# Patient Record
Sex: FEMALE | Race: OTHER | Employment: OTHER | URBAN - METROPOLITAN AREA
[De-identification: names, ages, dates, MRNs, and addresses within clinical notes are randomized per-mention and may not be internally consistent; named-entity substitution may affect disease eponyms.]

---

## 2017-02-06 NOTE — PATIENT DISCUSSION
Posterior Vitreous Detachment Counseling: I have discussed the diagnosis of PVD with the patient and the possibility of a retinal tear or detachment. The signs/symptoms of a retinal tear/detachment were reviewed to include but not limited to change in flashes of light, increase or change in floaters, decreased vision, part of the vision missing, veils or any other ocular concerns. I have further explained not all patients who develop a tear or detachment have notable symptoms, therefore, compliance with return visits are necessary. The patient was instructed to contact us immediately if they noticed any of the signs or symptoms of retinal detachment as noted above as the prognosis for any potential treatment options may be time related. Return for follow-up as scheduled.

## 2017-02-06 NOTE — PATIENT DISCUSSION
POSTERIOR VITREOUS DETACHMENT OU_: NO HOLES, TEARS, OR RETINAL DETACHMENTS TODAY. ADVISED PT TO CALL IF ANY FLASHES OF LIGHT, INCREASE IN FLOATERS, OR DECREASE VISION IN EITHER EYE.

## 2020-07-27 NOTE — PATIENT DISCUSSION
CATARACTS, OU: VISUALLY SIGNIFICANT. OPTION OF SURGERY VERSUS FOLLOWING VERSUS UPDATING GLASSES DISCUSSED. RBA'S DISCUSSED, PATIENT UNDERSTANDS AND DESIRES SURGERY TO INCREASE VISION FOR WATCHING TV, ENJOYING OUTDOOR ACTIVITIES, SEEING STREET SIGNS, AND TO REDUCE GLARE IN ORDER TO DRIVE SAFELY AT NIGHT. SCHEDULE CATARACT SURGERY/PRE-OP OU.

## 2020-08-17 NOTE — PATIENT DISCUSSION
CATARACT SX OD 08/25/2020: RBA'S DISCUSSED, PATIENT UNDERSTANDS AND DESIRES TO PROCEED WITH SURGERY. CONSENT READ AND SIGNED.   PATIENT DESIRES TORIC MULTIFOCAL PANOPTIX LENS OD.

## 2020-08-17 NOTE — PATIENT DISCUSSION
New Prescription: prednisoln ac-csjtyxee-rnwthlb (prednisoln yv-vrcjcern-uqoovhm): drops: 1-0.5-0.075% 1 drop four times a day as directed into affected eye 08-

## 2020-08-26 NOTE — PATIENT DISCUSSION
Continue: prednisoln am-qgsodmav-hdrikgb (prednisoln ef-oktlvgxj-xlgvunu): drops: 1-0.5-0.075% 1 drop four times a day as directed into affected eye 08-

## 2020-08-31 ENCOUNTER — NEW PATIENT COMPREHENSIVE (OUTPATIENT)
Dept: URBAN - METROPOLITAN AREA CLINIC 46 | Facility: CLINIC | Age: 60
End: 2020-08-31

## 2020-08-31 DIAGNOSIS — H52.7: ICD-10-CM

## 2020-08-31 PROCEDURE — 92004 COMPRE OPH EXAM NEW PT 1/>: CPT

## 2020-08-31 PROCEDURE — 92015 DETERMINE REFRACTIVE STATE: CPT

## 2020-08-31 ASSESSMENT — VISUAL ACUITY
OS_CC: J1
OS_CC: 20/20-1
OS_SC: J10
OD_SC: J10
OD_CC: 20/20-1
OS_SC: 20/25
OD_SC: 20/50-2
OD_CC: J1

## 2020-08-31 ASSESSMENT — TONOMETRY
OS_IOP_MMHG: 16
OD_IOP_MMHG: 15

## 2020-09-10 NOTE — PATIENT DISCUSSION
CATARACT OS: RBA'S DISCUSSED, PATIENT UNDERSTANDS AND DESIRES TO PROCEED WITH SURGERY. CONSENT READ AND SIGNED.   PATIENT DESIRES PANOPTIX MF TORIC

## 2020-09-10 NOTE — PATIENT DISCUSSION
Pre-Op 2nd Eye Counseling: The patient has noticed an improvement in their visual symptoms in the operative eye. The patient complains of decreased vision in the fellow eye when watching tv and driving. It was explained to the patient that the decision to proceed with cataract surgery in the fellow eye is entirely a separate decision from the surgical eye. All of the same risks, benefits and alternatives ere reviewed with the patient again. The patient does feel the vision in the non-operative eye is limiting their daily activities and elects to proceed with cataract surgery in the left eye. Schedule cataract surgery/ pre op OS.

## 2020-09-10 NOTE — PATIENT DISCUSSION
S/P PC IOL, OD.  DOING WELL. CONTINUE DROPS AS DIRECTED. SPECS RX OFFERED. RX ARC IN SPECS TO MINIMIZE GLARE. RETURN FOR FOLLOW-UP AS SCHEDULED.

## 2020-09-10 NOTE — PATIENT DISCUSSION
Continue: prednisoln vs-kickqrro-gfadahy (prednisoln ok-bjfoitle-revyief): drops: 1-0.5-0.075% 1 drop four times a day as directed into affected eye 08-

## 2020-09-23 NOTE — PATIENT DISCUSSION
Continue: prednisoln xr-ffzquqam-fnetshg (prednisoln rk-xcrivuhq-zcecdrn): drops: 1-0.5-0.075% 1 drop four times a day as directed into affected eye 08-

## 2020-10-06 NOTE — PATIENT DISCUSSION
Continue: prednisoln fr-ephsccrx-ayaabul (prednisoln pe-qugewmaq-mqilljm): drops: 1-0.5-0.075% 1 drop four times a day as directed into affected eye 08-

## 2021-09-15 ENCOUNTER — ESTABLISHED COMPREHENSIVE EXAM (OUTPATIENT)
Dept: URBAN - METROPOLITAN AREA CLINIC 46 | Facility: CLINIC | Age: 61
End: 2021-09-15

## 2021-09-15 DIAGNOSIS — H52.7: ICD-10-CM

## 2021-09-15 PROCEDURE — 92014 COMPRE OPH EXAM EST PT 1/>: CPT

## 2021-09-15 PROCEDURE — 92015 DETERMINE REFRACTIVE STATE: CPT

## 2021-09-15 ASSESSMENT — VISUAL ACUITY
OS_CC: J2
OD_SC: J10
OS_CC: 20/20
OD_SC: 20/70
OS_SC: 20/25
OD_CC: J2
OS_SC: J10
OD_CC: 20/20

## 2021-09-15 ASSESSMENT — TONOMETRY
OS_IOP_MMHG: 14
OD_IOP_MMHG: 14

## 2022-09-20 ENCOUNTER — COMPREHENSIVE EXAM (OUTPATIENT)
Dept: URBAN - METROPOLITAN AREA CLINIC 46 | Facility: CLINIC | Age: 62
End: 2022-09-20

## 2022-09-20 DIAGNOSIS — H52.7: ICD-10-CM

## 2022-09-20 PROCEDURE — 92014 COMPRE OPH EXAM EST PT 1/>: CPT

## 2022-09-20 PROCEDURE — 92015 DETERMINE REFRACTIVE STATE: CPT

## 2022-09-20 ASSESSMENT — VISUAL ACUITY
OD_CC: J1
OU_CC: J1+
OS_SC: 20/25-1
OS_CC: J1+
OS_CC: 20/20
OU_SC: 20/25
OS_SC: J8
OU_CC: 20/15-1
OD_SC: J8
OD_SC: 20/40-1
OU_SC: J4
OD_CC: 20/20

## 2022-09-20 ASSESSMENT — TONOMETRY
OD_IOP_MMHG: 12
OS_IOP_MMHG: 12

## 2023-09-21 ENCOUNTER — COMPREHENSIVE EXAM (OUTPATIENT)
Dept: URBAN - METROPOLITAN AREA CLINIC 46 | Facility: CLINIC | Age: 63
End: 2023-09-21

## 2023-09-21 DIAGNOSIS — H52.7: ICD-10-CM

## 2023-09-21 PROCEDURE — 92015 DETERMINE REFRACTIVE STATE: CPT

## 2023-09-21 PROCEDURE — 92014 COMPRE OPH EXAM EST PT 1/>: CPT

## 2023-09-21 ASSESSMENT — VISUAL ACUITY
OU_SC: 20/40
OS_SC: 20/40
OD_CC: J1
OS_CC: J1
OU_SC: J12
OD_SC: J12
OD_SC: 20/50
OU_CC: 20/20
OD_CC: 20/20
OS_CC: 20/20
OU_CC: J1
OS_SC: J12

## 2023-09-21 ASSESSMENT — TONOMETRY
OS_IOP_MMHG: 12
OD_IOP_MMHG: 13

## 2024-10-14 ENCOUNTER — COMPREHENSIVE EXAM (OUTPATIENT)
Dept: URBAN - METROPOLITAN AREA CLINIC 46 | Facility: CLINIC | Age: 64
End: 2024-10-14

## 2024-10-14 DIAGNOSIS — H52.7: ICD-10-CM

## 2024-10-14 PROCEDURE — 92015 DETERMINE REFRACTIVE STATE: CPT

## 2024-10-14 PROCEDURE — 92014 COMPRE OPH EXAM EST PT 1/>: CPT
